# Patient Record
Sex: FEMALE | Race: WHITE | NOT HISPANIC OR LATINO | URBAN - METROPOLITAN AREA
[De-identification: names, ages, dates, MRNs, and addresses within clinical notes are randomized per-mention and may not be internally consistent; named-entity substitution may affect disease eponyms.]

---

## 2021-09-13 ENCOUNTER — OFFICE VISIT (OUTPATIENT)
Dept: URBAN - METROPOLITAN AREA CLINIC 58 | Facility: CLINIC | Age: 66
End: 2021-09-13
Payer: COMMERCIAL

## 2021-09-13 DIAGNOSIS — H40.1133 PRIMARY OPEN-ANGLE GLAUCOMA, BILATERAL, SEVERE STAGE: Primary | ICD-10-CM

## 2021-09-13 PROCEDURE — 92020 GONIOSCOPY: CPT | Performed by: OPTOMETRIST

## 2021-09-13 PROCEDURE — 99204 OFFICE O/P NEW MOD 45 MIN: CPT | Performed by: OPTOMETRIST

## 2021-09-13 RX ORDER — BRIMONIDINE TARTRATE 2 MG/ML
0.2 % SOLUTION/ DROPS OPHTHALMIC
Qty: 15 | Refills: 3 | Status: INACTIVE
Start: 2021-09-13 | End: 2022-03-22

## 2021-09-13 ASSESSMENT — INTRAOCULAR PRESSURE
OD: 28
OS: 42
OD: 34
OS: 53
OS: 51
OD: 32
OS: 59
OD: 22

## 2021-09-13 ASSESSMENT — KERATOMETRY: OD: 43.00

## 2021-09-13 ASSESSMENT — VISUAL ACUITY: OS: 20/20

## 2021-09-13 NOTE — IMPRESSION/PLAN
Impression: Primary open-angle glaucoma, bilateral, severe stage: A32.6359. Plan: Discussed diagnosis in detail with pt. Will continue to observe patient's IOP and vision. Refer to Dr. Varsha Noble glaucoma specialist ASAP. Given 4 pills Diamox BID PO. Okay to send Rx of Diamox if apt w/ Dr. Varsha Noble is too far out. Start Brimonidine BID OU.

## 2022-03-22 ENCOUNTER — OFFICE VISIT (OUTPATIENT)
Dept: URBAN - METROPOLITAN AREA CLINIC 58 | Facility: CLINIC | Age: 67
End: 2022-03-22
Payer: COMMERCIAL

## 2022-03-22 DIAGNOSIS — E11.9 TYPE 2 DIABETES MELLITUS W/O COMPLICATION: Primary | ICD-10-CM

## 2022-03-22 PROCEDURE — 99214 OFFICE O/P EST MOD 30 MIN: CPT | Performed by: OPTOMETRIST

## 2022-03-22 RX ORDER — DORZOLAMIDE HCL 20 MG/ML
2 % SOLUTION/ DROPS OPHTHALMIC
Qty: 15 | Refills: 3 | Status: ACTIVE
Start: 2022-03-22

## 2022-03-22 RX ORDER — BRIMONIDINE TARTRATE 2 MG/ML
0.2 % SOLUTION/ DROPS OPHTHALMIC
Qty: 15 | Refills: 3 | Status: ACTIVE
Start: 2022-03-22

## 2022-03-22 RX ORDER — BIMATOPROST 0.1 MG/ML
0.01 % SOLUTION/ DROPS OPHTHALMIC
Qty: 15 | Refills: 3 | Status: INACTIVE
Start: 2022-03-22 | End: 2022-03-22

## 2022-03-22 ASSESSMENT — INTRAOCULAR PRESSURE
OD: 38
OS: 48

## 2022-03-22 NOTE — IMPRESSION/PLAN
Impression: Primary open-angle glaucoma, bilateral, severe stage: T58.1943. Plan: Discussed diagnosis in detail with pt. Will continue to observe patient's IOP and vision. Continue Brimonidine BID OU, start Lumigan QHS OU, and Dorzolamide BID OU. Diamox 250 MG given in office to elp lower IOP. Patient not happy w/ communication w/ Dr. Anjali Couch office.

## 2022-03-22 NOTE — IMPRESSION/PLAN
Impression: Type 2 diabetes mellitus w/o complication: N00.8. Plan: Diabetes type II: no background retinopathy, no signs of neovascularization noted. Discussed ocular and systemic benefits of blood sugar control.

## 2022-04-06 ENCOUNTER — OFFICE VISIT (OUTPATIENT)
Dept: URBAN - METROPOLITAN AREA CLINIC 58 | Facility: CLINIC | Age: 67
End: 2022-04-06
Payer: COMMERCIAL

## 2022-04-06 PROCEDURE — 92083 EXTENDED VISUAL FIELD XM: CPT | Performed by: OPTOMETRIST

## 2022-04-06 PROCEDURE — 99213 OFFICE O/P EST LOW 20 MIN: CPT | Performed by: OPTOMETRIST

## 2022-04-06 ASSESSMENT — INTRAOCULAR PRESSURE
OS: 20
OD: 23

## 2022-04-06 NOTE — IMPRESSION/PLAN
Impression: Primary open-angle glaucoma, bilateral, severe stage: F01.9486. Plan: Discussed diagnosis in detail with pt. Will continue to observe patient's IOP and vision. Continue Brimonidine BID OU, latanoprost QHS OU, and Dorzolamide BID OU. Patient not happy w/ communication w/ Dr. Gibson Safe office.

## 2022-06-06 ENCOUNTER — OFFICE VISIT (OUTPATIENT)
Dept: URBAN - METROPOLITAN AREA CLINIC 58 | Facility: CLINIC | Age: 67
End: 2022-06-06
Payer: COMMERCIAL

## 2022-06-06 DIAGNOSIS — H40.1133 PRIMARY OPEN-ANGLE GLAUCOMA, BILATERAL, SEVERE STAGE: Primary | ICD-10-CM

## 2022-06-06 PROCEDURE — 99214 OFFICE O/P EST MOD 30 MIN: CPT | Performed by: OPTOMETRIST

## 2022-06-06 PROCEDURE — 92133 CPTRZD OPH DX IMG PST SGM ON: CPT | Performed by: OPTOMETRIST

## 2022-06-06 ASSESSMENT — INTRAOCULAR PRESSURE
OS: 21
OD: 21

## 2022-06-06 NOTE — IMPRESSION/PLAN
Impression: Primary open-angle glaucoma, bilateral, severe stage: Q49.9050. Plan: Discussed diagnosis in detail with pt. IOP better but still elevated. Continue Brimonidine BID OU, latanoprost QHS OU, and Dorzolamide BID OU. Recommend to repeat SLT. Will refer pt to Dr. Baljinder Hernandes for SLT. Will continue to observe patient's IOP and vision.

## 2022-07-01 ENCOUNTER — OFFICE VISIT (OUTPATIENT)
Dept: URBAN - METROPOLITAN AREA CLINIC 60 | Facility: CLINIC | Age: 67
End: 2022-07-01
Payer: COMMERCIAL

## 2022-07-01 DIAGNOSIS — H40.1133 PRIMARY OPEN-ANGLE GLAUCOMA, BILATERAL, SEVERE STAGE: Primary | ICD-10-CM

## 2022-07-01 PROCEDURE — 99203 OFFICE O/P NEW LOW 30 MIN: CPT | Performed by: OPHTHALMOLOGY

## 2022-07-01 ASSESSMENT — INTRAOCULAR PRESSURE
OS: 27
OS: 24
OD: 25
OD: 27

## 2022-07-01 ASSESSMENT — KERATOMETRY
OS: 43.13
OD: 42.74

## 2022-07-01 NOTE — IMPRESSION/PLAN
Impression: Primary open-angle glaucoma, bilateral, severe stage: U30.9448. Plan: IOP elevated. Cont. Brimonidine BID OU, D/C Latanoprost. Switch to Lumigan OU QHS, and Dorzolamide BID OU. Schedule SLT both eyes same day for better IOP control and to prevent fluctuations and progression. Discussed R/B/A. Discussed possible need further treatment including additional laser and surgery. RL2. Diclofenac QID for 4 days starting the day of laser.

## 2022-08-24 ENCOUNTER — Encounter (OUTPATIENT)
Dept: URBAN - METROPOLITAN AREA SURGERY 38 | Facility: SURGERY | Age: 67
End: 2022-08-24
Payer: COMMERCIAL

## 2022-09-01 ENCOUNTER — POST-OPERATIVE VISIT (OUTPATIENT)
Dept: URBAN - METROPOLITAN AREA CLINIC 58 | Facility: CLINIC | Age: 67
End: 2022-09-01
Payer: COMMERCIAL

## 2022-09-01 DIAGNOSIS — Z48.810 ENCOUNTER FOR SURGICAL AFTERCARE FOLLOWING SURGERY ON A SENSE ORGAN: Primary | ICD-10-CM

## 2022-09-01 PROCEDURE — 99024 POSTOP FOLLOW-UP VISIT: CPT | Performed by: OPTOMETRIST

## 2022-09-01 ASSESSMENT — VISUAL ACUITY: OD: 20/40 -2

## 2022-09-01 ASSESSMENT — INTRAOCULAR PRESSURE
OD: 30
OS: 39

## 2022-09-01 NOTE — IMPRESSION/PLAN
Impression: S/P SLT (Selective Laser Trabeculoplasty) OU - 8 Days. Encounter for surgical aftercare following surgery on a sense organ  Z48.810.  Plan: 2 weeks IOP check d/c diclofenac
restart dorzolamide bid OU
restart lumigan 1gtt OU qhs
start brimonidine 1gtt OU bid

## 2022-09-16 ENCOUNTER — OFFICE VISIT (OUTPATIENT)
Dept: URBAN - METROPOLITAN AREA CLINIC 58 | Facility: CLINIC | Age: 67
End: 2022-09-16
Payer: COMMERCIAL

## 2022-09-16 DIAGNOSIS — H40.1133 PRIMARY OPEN-ANGLE GLAUCOMA, BILATERAL, SEVERE STAGE: Primary | ICD-10-CM

## 2022-09-16 PROCEDURE — 99213 OFFICE O/P EST LOW 20 MIN: CPT | Performed by: OPTOMETRIST

## 2022-09-16 ASSESSMENT — INTRAOCULAR PRESSURE
OD: 17
OS: 18

## 2022-09-16 NOTE — IMPRESSION/PLAN
Impression: Primary open-angle glaucoma, bilateral, severe stage: A21.4658. Plan: IOP much better. Continue lumigan 1gtt OU qhs. Brimonidine 1gtt OU bid, dorzolamide 1gtt OU bid.

## 2022-10-28 ENCOUNTER — OFFICE VISIT (OUTPATIENT)
Dept: URBAN - METROPOLITAN AREA CLINIC 58 | Facility: CLINIC | Age: 67
End: 2022-10-28
Payer: MEDICARE

## 2022-10-28 DIAGNOSIS — H40.1133 PRIMARY OPEN-ANGLE GLAUCOMA, BILATERAL, SEVERE STAGE: Primary | ICD-10-CM

## 2022-10-28 PROCEDURE — 99214 OFFICE O/P EST MOD 30 MIN: CPT | Performed by: OPTOMETRIST

## 2022-10-28 ASSESSMENT — INTRAOCULAR PRESSURE
OS: 19
OD: 19

## 2022-10-28 NOTE — IMPRESSION/PLAN
Impression: Primary open-angle glaucoma, bilateral, severe stage: X83.7432. Plan: IOP a little high, but possibly acceptable. PT declines starting another glaucoma drop at this time. Continue lumigan 1gtt OU qhs. Brimonidine 1gtt OU bid, dorzolamide 1gtt OU bid.